# Patient Record
Sex: MALE | Race: WHITE | NOT HISPANIC OR LATINO | Employment: FULL TIME | URBAN - METROPOLITAN AREA
[De-identification: names, ages, dates, MRNs, and addresses within clinical notes are randomized per-mention and may not be internally consistent; named-entity substitution may affect disease eponyms.]

---

## 2019-02-24 ENCOUNTER — APPOINTMENT (EMERGENCY)
Dept: RADIOLOGY | Facility: HOSPITAL | Age: 54
DRG: 872 | End: 2019-02-24
Payer: COMMERCIAL

## 2019-02-24 ENCOUNTER — APPOINTMENT (EMERGENCY)
Dept: CT IMAGING | Facility: HOSPITAL | Age: 54
DRG: 872 | End: 2019-02-24
Payer: COMMERCIAL

## 2019-02-24 ENCOUNTER — HOSPITAL ENCOUNTER (INPATIENT)
Facility: HOSPITAL | Age: 54
LOS: 1 days | Discharge: HOME | DRG: 872 | End: 2019-02-25
Attending: EMERGENCY MEDICINE | Admitting: INTERNAL MEDICINE
Payer: COMMERCIAL

## 2019-02-24 DIAGNOSIS — J11.1 INFLUENZA-LIKE ILLNESS: ICD-10-CM

## 2019-02-24 DIAGNOSIS — R06.02 SHORTNESS OF BREATH: ICD-10-CM

## 2019-02-24 DIAGNOSIS — J40 BRONCHITIS: ICD-10-CM

## 2019-02-24 DIAGNOSIS — R50.9 FEVER: Primary | ICD-10-CM

## 2019-02-24 PROBLEM — J45.901 MODERATE ASTHMA WITH ACUTE EXACERBATION: Status: ACTIVE | Noted: 2019-02-24

## 2019-02-24 PROBLEM — R91.8 LUNG NODULES: Status: ACTIVE | Noted: 2019-02-24

## 2019-02-24 PROBLEM — A41.9 SEPSIS DUE TO UNDETERMINED ORGANISM (HCC): Status: ACTIVE | Noted: 2019-02-24

## 2019-02-24 PROBLEM — J20.9 ACUTE INFECTIVE TRACHEOBRONCHITIS: Status: ACTIVE | Noted: 2019-02-24

## 2019-02-24 LAB
ALBUMIN SERPL BCP-MCNC: 3.8 G/DL (ref 3.5–5)
ALP SERPL-CCNC: 82 U/L (ref 46–116)
ALT SERPL W P-5'-P-CCNC: 33 U/L (ref 12–78)
ANION GAP SERPL CALCULATED.3IONS-SCNC: 8 MMOL/L (ref 4–13)
APTT PPP: 27 SECONDS (ref 26–38)
AST SERPL W P-5'-P-CCNC: 20 U/L (ref 5–45)
BASOPHILS # BLD AUTO: 0.05 THOUSANDS/ΜL (ref 0–0.1)
BASOPHILS NFR BLD AUTO: 1 % (ref 0–1)
BILIRUB SERPL-MCNC: 0.7 MG/DL (ref 0.2–1)
BUN SERPL-MCNC: 13 MG/DL (ref 5–25)
CALCIUM SERPL-MCNC: 8.9 MG/DL (ref 8.3–10.1)
CHLORIDE SERPL-SCNC: 101 MMOL/L (ref 100–108)
CO2 SERPL-SCNC: 26 MMOL/L (ref 21–32)
CREAT SERPL-MCNC: 1.11 MG/DL (ref 0.6–1.3)
EOSINOPHIL # BLD AUTO: 0.13 THOUSAND/ΜL (ref 0–0.61)
EOSINOPHIL NFR BLD AUTO: 1 % (ref 0–6)
ERYTHROCYTE [DISTWIDTH] IN BLOOD BY AUTOMATED COUNT: 14.6 % (ref 11.6–15.1)
GFR SERPL CREATININE-BSD FRML MDRD: 76 ML/MIN/1.73SQ M
GLUCOSE SERPL-MCNC: 117 MG/DL (ref 65–140)
HCT VFR BLD AUTO: 49.5 % (ref 36.5–49.3)
HGB BLD-MCNC: 16.3 G/DL (ref 12–17)
IMM GRANULOCYTES # BLD AUTO: 0.03 THOUSAND/UL (ref 0–0.2)
IMM GRANULOCYTES NFR BLD AUTO: 0 % (ref 0–2)
INR PPP: 0.99 (ref 0.86–1.17)
LACTATE SERPL-SCNC: 1.8 MMOL/L (ref 0.5–2)
LYMPHOCYTES # BLD AUTO: 0.29 THOUSANDS/ΜL (ref 0.6–4.47)
LYMPHOCYTES NFR BLD AUTO: 3 % (ref 14–44)
MCH RBC QN AUTO: 27.5 PG (ref 26.8–34.3)
MCHC RBC AUTO-ENTMCNC: 32.9 G/DL (ref 31.4–37.4)
MCV RBC AUTO: 84 FL (ref 82–98)
MONOCYTES # BLD AUTO: 1.08 THOUSAND/ΜL (ref 0.17–1.22)
MONOCYTES NFR BLD AUTO: 11 % (ref 4–12)
NEUTROPHILS # BLD AUTO: 8.12 THOUSANDS/ΜL (ref 1.85–7.62)
NEUTS SEG NFR BLD AUTO: 84 % (ref 43–75)
NRBC BLD AUTO-RTO: 0 /100 WBCS
PLATELET # BLD AUTO: 186 THOUSANDS/UL (ref 149–390)
PMV BLD AUTO: 11.5 FL (ref 8.9–12.7)
POTASSIUM SERPL-SCNC: 3.7 MMOL/L (ref 3.5–5.3)
PROCALCITONIN SERPL-MCNC: 0.05 NG/ML
PROT SERPL-MCNC: 7.4 G/DL (ref 6.4–8.2)
PROTHROMBIN TIME: 12.8 SECONDS (ref 11.8–14.2)
RBC # BLD AUTO: 5.92 MILLION/UL (ref 3.88–5.62)
SODIUM SERPL-SCNC: 135 MMOL/L (ref 136–145)
WBC # BLD AUTO: 9.7 THOUSAND/UL (ref 4.31–10.16)

## 2019-02-24 PROCEDURE — 94640 AIRWAY INHALATION TREATMENT: CPT

## 2019-02-24 PROCEDURE — 99285 EMERGENCY DEPT VISIT HI MDM: CPT

## 2019-02-24 PROCEDURE — 99223 1ST HOSP IP/OBS HIGH 75: CPT | Performed by: INTERNAL MEDICINE

## 2019-02-24 PROCEDURE — 83605 ASSAY OF LACTIC ACID: CPT | Performed by: EMERGENCY MEDICINE

## 2019-02-24 PROCEDURE — 85610 PROTHROMBIN TIME: CPT | Performed by: EMERGENCY MEDICINE

## 2019-02-24 PROCEDURE — 94760 N-INVAS EAR/PLS OXIMETRY 1: CPT

## 2019-02-24 PROCEDURE — 85730 THROMBOPLASTIN TIME PARTIAL: CPT | Performed by: EMERGENCY MEDICINE

## 2019-02-24 PROCEDURE — 71045 X-RAY EXAM CHEST 1 VIEW: CPT

## 2019-02-24 PROCEDURE — 87040 BLOOD CULTURE FOR BACTERIA: CPT | Performed by: EMERGENCY MEDICINE

## 2019-02-24 PROCEDURE — 96375 TX/PRO/DX INJ NEW DRUG ADDON: CPT

## 2019-02-24 PROCEDURE — 94664 DEMO&/EVAL PT USE INHALER: CPT

## 2019-02-24 PROCEDURE — 94644 CONT INHLJ TX 1ST HOUR: CPT

## 2019-02-24 PROCEDURE — 71275 CT ANGIOGRAPHY CHEST: CPT

## 2019-02-24 PROCEDURE — 94150 VITAL CAPACITY TEST: CPT

## 2019-02-24 PROCEDURE — 36415 COLL VENOUS BLD VENIPUNCTURE: CPT | Performed by: EMERGENCY MEDICINE

## 2019-02-24 PROCEDURE — 87631 RESP VIRUS 3-5 TARGETS: CPT | Performed by: EMERGENCY MEDICINE

## 2019-02-24 PROCEDURE — 85025 COMPLETE CBC W/AUTO DIFF WBC: CPT | Performed by: EMERGENCY MEDICINE

## 2019-02-24 PROCEDURE — 84145 PROCALCITONIN (PCT): CPT | Performed by: EMERGENCY MEDICINE

## 2019-02-24 PROCEDURE — 96365 THER/PROPH/DIAG IV INF INIT: CPT

## 2019-02-24 PROCEDURE — 93005 ELECTROCARDIOGRAM TRACING: CPT

## 2019-02-24 PROCEDURE — 96360 HYDRATION IV INFUSION INIT: CPT

## 2019-02-24 PROCEDURE — 80053 COMPREHEN METABOLIC PANEL: CPT | Performed by: EMERGENCY MEDICINE

## 2019-02-24 RX ORDER — IBUPROFEN 400 MG/1
400 TABLET ORAL ONCE
Status: DISCONTINUED | OUTPATIENT
Start: 2019-02-24 | End: 2019-02-24

## 2019-02-24 RX ORDER — IBUPROFEN 600 MG/1
600 TABLET ORAL ONCE
Status: COMPLETED | OUTPATIENT
Start: 2019-02-24 | End: 2019-02-24

## 2019-02-24 RX ORDER — MONTELUKAST SODIUM 10 MG/1
10 TABLET ORAL
COMMUNITY
End: 2020-03-02 | Stop reason: SDUPTHER

## 2019-02-24 RX ORDER — CALCIUM CARBONATE 200(500)MG
1000 TABLET,CHEWABLE ORAL DAILY PRN
Status: DISCONTINUED | OUTPATIENT
Start: 2019-02-24 | End: 2019-02-25 | Stop reason: HOSPADM

## 2019-02-24 RX ORDER — ALBUTEROL SULFATE 2.5 MG/3ML
5 SOLUTION RESPIRATORY (INHALATION) ONCE
Status: DISCONTINUED | OUTPATIENT
Start: 2019-02-24 | End: 2019-02-24

## 2019-02-24 RX ORDER — GUAIFENESIN/DEXTROMETHORPHAN 100-10MG/5
10 SYRUP ORAL EVERY 4 HOURS PRN
Status: DISCONTINUED | OUTPATIENT
Start: 2019-02-24 | End: 2019-02-25 | Stop reason: HOSPADM

## 2019-02-24 RX ORDER — FLUTICASONE FUROATE AND VILANTEROL 200; 25 UG/1; UG/1
1 POWDER RESPIRATORY (INHALATION) DAILY
Status: DISCONTINUED | OUTPATIENT
Start: 2019-02-25 | End: 2019-02-25 | Stop reason: HOSPADM

## 2019-02-24 RX ORDER — SODIUM CHLORIDE FOR INHALATION 0.9 %
3 VIAL, NEBULIZER (ML) INHALATION ONCE
Status: COMPLETED | OUTPATIENT
Start: 2019-02-24 | End: 2019-02-24

## 2019-02-24 RX ORDER — METHYLPREDNISOLONE SODIUM SUCCINATE 40 MG/ML
40 INJECTION, POWDER, LYOPHILIZED, FOR SOLUTION INTRAMUSCULAR; INTRAVENOUS EVERY 8 HOURS SCHEDULED
Status: DISCONTINUED | OUTPATIENT
Start: 2019-02-24 | End: 2019-02-25

## 2019-02-24 RX ORDER — ACETAMINOPHEN 325 MG/1
975 TABLET ORAL ONCE
Status: COMPLETED | OUTPATIENT
Start: 2019-02-24 | End: 2019-02-24

## 2019-02-24 RX ORDER — MONTELUKAST SODIUM 10 MG/1
10 TABLET ORAL
Status: DISCONTINUED | OUTPATIENT
Start: 2019-02-24 | End: 2019-02-25 | Stop reason: HOSPADM

## 2019-02-24 RX ORDER — DOCUSATE SODIUM 100 MG/1
100 CAPSULE, LIQUID FILLED ORAL 2 TIMES DAILY PRN
Status: DISCONTINUED | OUTPATIENT
Start: 2019-02-24 | End: 2019-02-25 | Stop reason: HOSPADM

## 2019-02-24 RX ORDER — ONDANSETRON 2 MG/ML
4 INJECTION INTRAMUSCULAR; INTRAVENOUS EVERY 6 HOURS PRN
Status: DISCONTINUED | OUTPATIENT
Start: 2019-02-24 | End: 2019-02-25 | Stop reason: HOSPADM

## 2019-02-24 RX ORDER — METHYLPREDNISOLONE SODIUM SUCCINATE 125 MG/2ML
80 INJECTION, POWDER, LYOPHILIZED, FOR SOLUTION INTRAMUSCULAR; INTRAVENOUS ONCE
Status: COMPLETED | OUTPATIENT
Start: 2019-02-24 | End: 2019-02-24

## 2019-02-24 RX ORDER — GUAIFENESIN 600 MG
600 TABLET, EXTENDED RELEASE 12 HR ORAL EVERY 12 HOURS SCHEDULED
Status: DISCONTINUED | OUTPATIENT
Start: 2019-02-24 | End: 2019-02-25 | Stop reason: HOSPADM

## 2019-02-24 RX ORDER — IPRATROPIUM BROMIDE AND ALBUTEROL SULFATE 2.5; .5 MG/3ML; MG/3ML
3 SOLUTION RESPIRATORY (INHALATION) ONCE
Status: COMPLETED | OUTPATIENT
Start: 2019-02-24 | End: 2019-02-24

## 2019-02-24 RX ORDER — IPRATROPIUM BROMIDE AND ALBUTEROL SULFATE 2.5; .5 MG/3ML; MG/3ML
3 SOLUTION RESPIRATORY (INHALATION)
Status: DISCONTINUED | OUTPATIENT
Start: 2019-02-24 | End: 2019-02-25

## 2019-02-24 RX ADMIN — IOHEXOL 85 ML: 350 INJECTION, SOLUTION INTRAVENOUS at 17:42

## 2019-02-24 RX ADMIN — METHYLPREDNISOLONE SODIUM SUCCINATE 40 MG: 40 INJECTION, POWDER, FOR SOLUTION INTRAMUSCULAR; INTRAVENOUS at 21:33

## 2019-02-24 RX ADMIN — MONTELUKAST SODIUM 10 MG: 10 TABLET, FILM COATED ORAL at 21:32

## 2019-02-24 RX ADMIN — ACETAMINOPHEN 975 MG: 325 TABLET, FILM COATED ORAL at 16:37

## 2019-02-24 RX ADMIN — ALBUTEROL SULFATE 10 MG: 2.5 SOLUTION RESPIRATORY (INHALATION) at 18:39

## 2019-02-24 RX ADMIN — SODIUM CHLORIDE 2200 ML: 0.9 INJECTION, SOLUTION INTRAVENOUS at 17:28

## 2019-02-24 RX ADMIN — AZITHROMYCIN MONOHYDRATE 500 MG: 500 INJECTION, POWDER, LYOPHILIZED, FOR SOLUTION INTRAVENOUS at 18:19

## 2019-02-24 RX ADMIN — IPRATROPIUM BROMIDE AND ALBUTEROL SULFATE 3 ML: 2.5; .5 SOLUTION RESPIRATORY (INHALATION) at 21:42

## 2019-02-24 RX ADMIN — IBUPROFEN 600 MG: 600 TABLET ORAL at 16:37

## 2019-02-24 RX ADMIN — IPRATROPIUM BROMIDE AND ALBUTEROL SULFATE 3 ML: 2.5; .5 SOLUTION RESPIRATORY (INHALATION) at 17:17

## 2019-02-24 RX ADMIN — ISODIUM CHLORIDE 3 ML: 0.03 SOLUTION RESPIRATORY (INHALATION) at 18:39

## 2019-02-24 RX ADMIN — METHYLPREDNISOLONE SODIUM SUCCINATE 80 MG: 125 INJECTION, POWDER, FOR SOLUTION INTRAMUSCULAR; INTRAVENOUS at 16:17

## 2019-02-24 RX ADMIN — IPRATROPIUM BROMIDE 1 MG: 0.5 SOLUTION RESPIRATORY (INHALATION) at 18:39

## 2019-02-24 RX ADMIN — GUAIFENESIN 600 MG: 600 TABLET, EXTENDED RELEASE ORAL at 21:32

## 2019-02-24 RX ADMIN — IPRATROPIUM BROMIDE AND ALBUTEROL SULFATE 3 ML: 2.5; .5 SOLUTION RESPIRATORY (INHALATION) at 16:09

## 2019-02-24 RX ADMIN — CEFTRIAXONE 1000 MG: 1 INJECTION, POWDER, FOR SOLUTION INTRAMUSCULAR; INTRAVENOUS at 17:22

## 2019-02-24 NOTE — ASSESSMENT & PLAN NOTE
· Chronic moderate asthma with acute exacerbation  · Continue with IV Solu Medrol 40 mg q 8 hours  · Respiratory protocol and nebs  · Peak flow rate  · Supplemental oxygen

## 2019-02-24 NOTE — ASSESSMENT & PLAN NOTE
· Preibronchial thickening and ground glass opacities noted consistent with bronchitis  · Continue IV Rocephin and azithromycin  · Check pro calcitonin  · Obtain sputum for culture and sensitivity if able  · Follow blood cultures  · Follow influenza and RSV PCR

## 2019-02-24 NOTE — ED PROVIDER NOTES
History  Chief Complaint   Patient presents with    Asthma     Pt  c/o shortness of breath with asthma exascerbation for two days  Pt  takes singulair and advair with no relief  Pt  has been around family with fever and illness  56-year-old male presents today complaining of shortness of breath for 2 days  He does have a history of asthma, and has been using inhalers and singular at home without relief  Started with fever today, reports of daughter had an upper respiratory/flu-like illness earlier in the week  History provided by:  Patient  Shortness of Breath   Severity:  Moderate  Onset quality:  Gradual  Duration:  2 days  Timing:  Constant  Progression:  Worsening  Chronicity:  New  Context comment:  See HPI  Relieved by:  Nothing  Worsened by:  Nothing  Ineffective treatments:  Inhaler  Associated symptoms: fever    Associated symptoms: no abdominal pain, no chest pain, no headaches, no rash and no sore throat    Risk factors: no recent alcohol use, no obesity and no prolonged immobilization        Prior to Admission Medications   Prescriptions Last Dose Informant Patient Reported? Taking? albuterol (PROVENTIL HFA,VENTOLIN HFA) 90 mcg/act inhaler   Yes Yes   Sig: Inhale 2 puffs every 6 (six) hours as needed for wheezing   fluticasone-salmeterol (ADVAIR) 250-50 mcg/dose inhaler   Yes Yes   Sig: Inhale 1 puff 2 (two) times a day Rinse mouth after use    montelukast (SINGULAIR) 10 mg tablet   Yes Yes   Sig: Take 10 mg by mouth daily at bedtime      Facility-Administered Medications: None       Past Medical History:   Diagnosis Date    Asthma        Past Surgical History:   Procedure Laterality Date    NOSE SURGERY         No family history on file  I have reviewed and agree with the history as documented      Social History     Tobacco Use    Smoking status: Not on file   Substance Use Topics    Alcohol use: Not on file    Drug use: Not on file        Review of Systems   Constitutional: Positive for chills, fatigue and fever  HENT: Negative for postnasal drip, sore throat and trouble swallowing  Eyes: Negative for visual disturbance  Respiratory: Positive for chest tightness and shortness of breath  Cardiovascular: Negative for chest pain  Gastrointestinal: Negative for abdominal pain  Genitourinary: Negative for dysuria  Musculoskeletal: Negative for back pain  Skin: Negative for rash  Allergic/Immunologic: Negative for immunocompromised state  Neurological: Negative for dizziness, light-headedness and headaches  Psychiatric/Behavioral: Positive for confusion  Physical Exam  Physical Exam   Constitutional: He is oriented to person, place, and time  He appears well-developed and well-nourished  He appears distressed ( appears uncomfortable)  HENT:   Head: Normocephalic and atraumatic  Mouth/Throat: Uvula is midline, oropharynx is clear and moist and mucous membranes are normal  No tonsillar exudate  Eyes: Pupils are equal, round, and reactive to light  Neck: Normal range of motion  Neck supple  Cardiovascular: Tachycardia present  Pulmonary/Chest: Tachypnea (Mildly tachypneic at 22 respirations per minute) noted  He has decreased breath sounds ( diffusely)  He has wheezes ( moderate, diffuse)  Abdominal: Soft  Bowel sounds are normal  There is no tenderness  There is no rebound and no guarding  Musculoskeletal: He exhibits no edema, tenderness or deformity  Neurological: He is alert and oriented to person, place, and time  Patient moving all extremities equally, no focal neuro deficits noted  Skin: Skin is warm and dry  Capillary refill takes less than 2 seconds  Psychiatric: He has a normal mood and affect  Nursing note and vitals reviewed        Vital Signs  ED Triage Vitals   Temperature Pulse Respirations Blood Pressure SpO2   02/24/19 1550 02/24/19 1550 02/24/19 1550 02/24/19 1600 02/24/19 1550   (!) 101 9 °F (38 8 °C) (!) 111 22 136/83 93 %      Temp Source Heart Rate Source Patient Position - Orthostatic VS BP Location FiO2 (%)   02/24/19 1550 02/24/19 1630 02/24/19 1600 02/24/19 1600 --   Oral Monitor Lying Right arm       Pain Score       02/24/19 1637       No Pain           Vitals:    02/24/19 1630 02/24/19 1700 02/24/19 1735 02/24/19 1800   BP: 127/72 99/62 108/64 113/67   Pulse: (!) 113 (!) 106  (!) 112   Patient Position - Orthostatic VS: Lying Lying  Lying       Visual Acuity      ED Medications  Medications   azithromycin (ZITHROMAX) 500 mg in sodium chloride 0 9% 250mL IVPB 500 mg (500 mg Intravenous New Bag 2/24/19 1819)   sodium chloride 0 9 % bolus 2,200 mL (2,200 mL Intravenous New Bag 2/24/19 1728)   albuterol inhalation solution 10 mg (has no administration in time range)     And   ipratropium (ATROVENT) 0 02 % inhalation solution 1 mg (has no administration in time range)     And   sodium chloride 0 9 % inhalation solution 3 mL (has no administration in time range)   methylPREDNISolone sodium succinate (Solu-MEDROL) injection 80 mg (80 mg Intravenous Given 2/24/19 1617)   ipratropium-albuterol (DUO-NEB) 0 5-2 5 mg/3 mL inhalation solution 3 mL (3 mL Nebulization Given 2/24/19 1609)   acetaminophen (TYLENOL) tablet 975 mg (975 mg Oral Given 2/24/19 1637)   ibuprofen (MOTRIN) tablet 600 mg (600 mg Oral Given 2/24/19 1637)   ceftriaxone (ROCEPHIN) 1 g/50 mL in dextrose IVPB (0 mg Intravenous Stopped 2/24/19 1819)   ipratropium-albuterol (DUO-NEB) 0 5-2 5 mg/3 mL inhalation solution 3 mL (3 mL Nebulization Given 2/24/19 1717)   iohexol (OMNIPAQUE) 350 MG/ML injection (MULTI-DOSE) 85 mL (85 mL Intravenous Given 2/24/19 1742)       Diagnostic Studies  Results Reviewed     Procedure Component Value Units Date/Time    Lactic acid x2 [[id number]]  (Normal) Collected:  02/24/19 1619    Lab Status:  Final result Specimen:  Blood from Arm, Right Updated:  02/24/19 1651     LACTIC ACID 1 8 mmol/L     Narrative:       Result may be elevated if tourniquet was used during collection  Comprehensive metabolic panel [[id number]]  (Abnormal) Collected:  02/24/19 1619    Lab Status:  Final result Specimen:  Blood from Arm, Right Updated:  02/24/19 1648     Sodium 135 mmol/L      Potassium 3 7 mmol/L      Chloride 101 mmol/L      CO2 26 mmol/L      ANION GAP 8 mmol/L      BUN 13 mg/dL      Creatinine 1 11 mg/dL      Glucose 117 mg/dL      Calcium 8 9 mg/dL      AST 20 U/L      ALT 33 U/L      Alkaline Phosphatase 82 U/L      Total Protein 7 4 g/dL      Albumin 3 8 g/dL      Total Bilirubin 0 70 mg/dL      eGFR 76 ml/min/1 73sq m     Narrative:       National Kidney Disease Education Program recommendations are as follows:  GFR calculation is accurate only with a steady state creatinine  Chronic Kidney disease less than 60 ml/min/1 73 sq  meters  Kidney failure less than 15 ml/min/1 73 sq  meters      Protime-INR [[id number]]  (Normal) Collected:  02/24/19 1619    Lab Status:  Final result Specimen:  Blood from Arm, Right Updated:  02/24/19 1642     Protime 12 8 seconds      INR 0 99    APTT [[id number]]  (Normal) Collected:  02/24/19 1619    Lab Status:  Final result Specimen:  Blood from Arm, Right Updated:  02/24/19 1642     PTT 27 seconds     CBC and differential [[id number]]  (Abnormal) Collected:  02/24/19 1619    Lab Status:  Final result Specimen:  Blood from Arm, Right Updated:  02/24/19 1632     WBC 9 70 Thousand/uL      RBC 5 92 Million/uL      Hemoglobin 16 3 g/dL      Hematocrit 49 5 %      MCV 84 fL      MCH 27 5 pg      MCHC 32 9 g/dL      RDW 14 6 %      MPV 11 5 fL      Platelets 407 Thousands/uL      nRBC 0 /100 WBCs      Neutrophils Relative 84 %      Immat GRANS % 0 %      Lymphocytes Relative 3 %      Monocytes Relative 11 %      Eosinophils Relative 1 %      Basophils Relative 1 %      Neutrophils Absolute 8 12 Thousands/µL      Immature Grans Absolute 0 03 Thousand/uL      Lymphocytes Absolute 0 29 Thousands/µL      Monocytes Absolute 1 08 Thousand/µL      Eosinophils Absolute 0 13 Thousand/µL      Basophils Absolute 0 05 Thousands/µL     Procalcitonin [[id number]] Collected:  02/24/19 1619    Lab Status: In process Specimen:  Blood from Arm, Right Updated:  02/24/19 1630    Influenza A/B and RSV by PCR [[id number]] Collected:  02/24/19 1623    Lab Status: In process Specimen:  Nasopharyngeal Swab Updated:  02/24/19 1629    Blood culture #1 [[id number]] Collected:  02/24/19 1619    Lab Status: In process Specimen:  Blood from Arm, Right Updated:  02/24/19 1629    Blood culture #2 [[id number]] Collected:  02/24/19 1621    Lab Status: In process Specimen:  Blood from Arm, Left Updated:  02/24/19 1629    Lactic acid x2 [[id number]]     Lab Status:  No result Specimen:  Blood                  CTA ED chest PE study   Final Result by Alex Andre DO (02/24 1802)   No central pulmonary emboli  The more peripheral branch arteries are not as well opacified but appear grossly patent  There is diffuse peribronchial thickening observed in both lungs, most pronounced in the right lower lobe with resulting luminal narrowing  Etiology could be infectious (bronchitis) or inflammatory/allergic including asthma  Scattered groundglass nodular    opacities in the basilar right lower lobe may be atelectatic or inflammatory/infectious  No lobar consolidation  No pleural effusion  No cardiomegaly or vascular congestion to suggest pulmonary edema  Reactive prominent lymph node in the mediastinum and    both shandra      Pulmonary nodules measuring up to 7 mm in the left upper lobe  Based on current Fleischner Society 2017 Guidelines on incidental pulmonary nodule, followup non-contrast CT is recommended at 3-6 months from the initial examination and, if stable at that time, an additional followup is recommended for 18-24 months    from the initial examination  Small sliding hiatal hernia  The study was marked in [facility] for immediate notification  Workstation performed: [id number]         XR chest portable    (Results Pending)              Procedures  ECG 12 Lead Documentation  Date/Time: 2/24/2019 5:47 PM  Performed by: Niecy Mccain DO  Authorized by: Yeni Shaw DO     Indications / Diagnosis:  Shortness of breath  ECG reviewed by me, the ED Provider: yes    Patient location:  ED  Previous ECG:     Previous ECG:  Unavailable  Comments:      Sinus tachycardia at 106 beats per minute  Normal axis, normal intervals, nonspecific ST T wave abnormalities inferior laterally  QTC is normal   There is no old available for comparison  Phone Contacts  ED Phone Contact    ED Course                               MDM  Number of Diagnoses or Management Options  Bronchitis: new and requires workup  Fever: new and requires workup  Influenza-like illness: new and requires workup  Shortness of breath: new and requires workup  Diagnosis management comments: 5:15 PM  Sepsis fluid orders in now  Patient did not have a documented weight when I put my initial orders an which is the cause for the delay in ordering sepsis fluids  Patient may have a developing right middle lobe infiltrate on chest x-ray, however I am going to order a CT scan his chest to get a better view of his chest and as well as rule out PE     6:21 PM  Reports feeling short of breath again  O2 sat 97% on 2L NC  Breath sounds still wheezing diffusely  Will d/w SLIM for admission  6:24 PM  MDM: Patient presents to the Emergency Department and was diagnosed with acute fever and shortness of breath  This is a new problem that will require additional planned work-up in a hospitalized setting  Clinical laboratory testing, radiology imaging, and medical testing (EKG) were ordered  I independently reviewed the radiologic imaging, EKG, and laboratory studies    This case is considered high risk secondary to the above listed disease process that poses a threat to bodily function that requires further diagnostic testing and management which may include the administration of parenteral controlled substances  Discussed with ANTHONY  We had a detailed discussion of the patient's condition and case,  including need for admission  Accepts to his/her service  Bed request/bridging orders placed  Amount and/or Complexity of Data Reviewed  Clinical lab tests: ordered and reviewed  Tests in the radiology section of CPT®: ordered and reviewed  Tests in the medicine section of CPT®: reviewed and ordered  Review and summarize past medical records: yes  Independent visualization of images, tracings, or specimens: yes    Risk of Complications, Morbidity, and/or Mortality  Presenting problems: high  Diagnostic procedures: high  Management options: high    Patient Progress  Patient progress: stable      Disposition  Final diagnoses:   Fever   Shortness of breath   Bronchitis   Influenza-like illness     Time reflects when diagnosis was documented in both MDM as applicable and the Disposition within this note     Time User Action Codes Description Comment    2/24/2019  5:22 PM [employer] Add [R50 9] Fever     2/24/2019  5:22 PM [employer] Add [R06 02] Shortness of breath     2/24/2019  6:20 PM [employer] Add [J40] Bronchitis     2/24/2019  6:20 PM Azra Shaw illness       ED Disposition     ED Disposition Condition Date/Time Comment    Admit Stable Sun Feb 24, 2019  6:24 PM Case was discussed with ANTHONY and the patient's admission status was agreed to be Admission Status: inpatient status to the service of Dr Santana Delacruz   Follow-up Information    None         Patient's Medications   Discharge Prescriptions    No medications on file     No discharge procedures on file      ED Provider  Electronically Signed by           Jonas Vargas DO  02/24/19 5897

## 2019-02-24 NOTE — LETTER
[address] [facility] MED SURG UNIT  [address] [facility] [address]  Dept: [phone]    February 25, 2019     Patient: Rebecca Vines   YOB: 1965   Date of Visit: 2/24/2019       To Whom it May Concern:    Little Kaye is under my professional care  He was seen in the hospital from 2/24/2019   to 02/25/19  He may return to work on March 4, 2019  without limitations  If you have any questions or concerns, please don't hesitate to call           Sincerely,          Deirdre Bello PA-C  [address] [facility]   Internal Medicine Hospitalist service   [phone]

## 2019-02-24 NOTE — H&P
H&P- Nunu [facility] 1965, 47 y o  male MRN: [id number]    Unit/Bed#: ED 29 Encounter: [id number]    Primary Care Provider: No primary care provider on file  Date and time admitted to hospital: 2/24/2019  3:49 PM    * Moderate asthma with acute exacerbation  Assessment & Plan  · Chronic moderate asthma with acute exacerbation  · Continue with IV Solu Medrol 40 mg q 8 hours  · Respiratory protocol and nebs  · Peak flow rate  · Supplemental oxygen    Acute bronchitis due to infection  Assessment & Plan  · Preibronchial thickening and ground glass opacities noted consistent with bronchitis  · Continue IV Rocephin and azithromycin  · Check pro calcitonin  · Obtain sputum for culture and sensitivity if able  · Follow blood cultures  · Follow influenza and RSV PCR    Sepsis due to undetermined organism [facility])  Assessment & Plan  · Fever, tachycardia and bronchitis  · Continue to monitor parameters  · Lactic acid was negative    Lung nodules  Assessment & Plan  · Patient is nonsmoker, low risk patient  · Outpatient surveillance      VTE Prophylaxis: Enoxaparin (Lovenox)  / sequential compression device   Code Status:  Full code  POLST: POLST form is on file already (pre-hospital) and There is no POLST form on file for this patient (pre-hospital)  Discussion with family:  Wife at bedside    Anticipated Length of Stay:  Patient will be admitted on an Inpatient basis with an anticipated length of stay of  > 2 midnights  Justification for Hospital Stay:  Sepsis/bronchitis and asthma exacerbation    Total Time for Visit, including Counseling / Coordination of Care: 1 hour  Greater than 50% of this total time spent on direct patient counseling and coordination of care  Chief Complaint:   Shortness of breath     History of Present Illness:    Nunu Reynoso is a 47 y o  male patient with asthma who presents with shortness of breath over last 2 days    Patient was in contact with his daughter who was having upper respiratory tract infection and flu like illness  He started having increasing shortness of breath over last 2 days  Today he also had fever  Has been using Advair and annular at home without relief  Reports chills  Positive cough without any sputum  No hemoptysis  Denies chest pain  No nausea vomiting or diarrhea  History of asthma diagnosed at the age of 9  Has been well controlled  No history of intubations  Follows with allergist      Review of Systems:    Review of Systems  Twelve point review systems negative except noted above  Past Medical and Surgical History:     Past Medical History:   Diagnosis Date    Asthma        Past Surgical History:   Procedure Laterality Date    NOSE SURGERY         Meds/Allergies:    Prior to Admission medications    Medication Sig Start Date End Date Taking? Authorizing Provider   albuterol (PROVENTIL HFA,VENTOLIN HFA) 90 mcg/act inhaler Inhale 2 puffs every 6 (six) hours as needed for wheezing   Yes Historical Provider, MD   fluticasone-salmeterol (ADVAIR) 250-50 mcg/dose inhaler Inhale 1 puff 2 (two) times a day Rinse mouth after use  Yes Historical Provider, MD   montelukast (SINGULAIR) 10 mg tablet Take 10 mg by mouth daily at bedtime   Yes Historical Provider, MD     I have reviewed home medications with patient personally      Allergies: No Known Allergies    Social History:     Marital Status: /Civil Union   Occupation:  Works on [employer]  Patient Pre-hospital Living Situation:  Lives with family  Patient Pre-hospital Level of Mobility:  Independent  Patient Pre-hospital Diet Restrictions:  None  Substance Use History:   Social History     Substance and Sexual Activity   Alcohol Use Not on file     Social History     Tobacco Use   Smoking Status Not on file     Social History     Substance and Sexual Activity   Drug Use Not on file       Family History:    non-contributory    Physical Exam:     Vitals:   Blood Pressure: 94/50 (02/24/19 1830)  Pulse: 104 (02/24/19 1830)  Temperature: 98 4 °F (36 9 °C) (02/24/19 1821)  Temp Source: Oral (02/24/19 1821)  Respirations: 22 (02/24/19 1830)  Height: 5' 6" (167 6 cm) (02/24/19 1628)  Weight - Scale: 73 3 kg (161 lb 9 6 oz) (02/24/19 1550)  SpO2: 95 % (02/24/19 1840)    Physical Exam     Gen -Patient comfortable   Neck- Supple  No thyromegaly or lymphadenopathy  Mild use of accessory muscles  Unable to complete full sentences  Lungs-bilateral expiratory wheezing  Heart S1-S2, regular rate and rhythm, no murmurs tachycardia  Abdomen-soft nontender, no organomegaly  Bowel sounds present  Extremities-no cyanosi,  clubbing or edema  Skin- no rash  Neuro-nonfocal       Additional Data:     Lab Results: I have personally reviewed pertinent reports  Results from last 7 days   Lab Units 02/24/19  1619   WBC Thousand/uL 9 70   HEMOGLOBIN g/dL 16 3   HEMATOCRIT % 49 5*   PLATELETS Thousands/uL 186   NEUTROS PCT % 84*   LYMPHS PCT % 3*   MONOS PCT % 11   EOS PCT % 1     Results from last 7 days   Lab Units 02/24/19  1619   SODIUM mmol/L 135*   POTASSIUM mmol/L 3 7   CHLORIDE mmol/L 101   CO2 mmol/L 26   BUN mg/dL 13   CREATININE mg/dL 1 11   ANION GAP mmol/L 8   CALCIUM mg/dL 8 9   ALBUMIN g/dL 3 8   TOTAL BILIRUBIN mg/dL 0 70   ALK PHOS U/L 82   ALT U/L 33   AST U/L 20   GLUCOSE RANDOM mg/dL 117     Results from last 7 days   Lab Units 02/24/19  1619   INR  0 99             Results from last 7 days   Lab Units 02/24/19  1619   LACTIC ACID mmol/L 1 8       Imaging: I have personally reviewed pertinent reports  CTA ED chest PE study   Final Result by Hari Farrar DO (02/24 1802)   No central pulmonary emboli  The more peripheral branch arteries are not as well opacified but appear grossly patent  There is diffuse peribronchial thickening observed in both lungs, most pronounced in the right lower lobe with resulting luminal narrowing   Etiology could be infectious (bronchitis) or inflammatory/allergic including asthma  Scattered groundglass nodular    opacities in the basilar right lower lobe may be atelectatic or inflammatory/infectious  No lobar consolidation  No pleural effusion  No cardiomegaly or vascular congestion to suggest pulmonary edema  Reactive prominent lymph node in the mediastinum and    both shandra      Pulmonary nodules measuring up to 7 mm in the left upper lobe  Based on current Fleischner Society 2017 Guidelines on incidental pulmonary nodule, followup non-contrast CT is recommended at 3-6 months from the initial examination and, if stable at that time, an additional followup is recommended for 18-24 months    from the initial examination  Small sliding hiatal hernia  The study was marked in [facility] for immediate notification  Workstation performed: [id number]         XR chest portable    (Results Pending)       EKG, Pathology, and Other Studies Reviewed on Admission:   · EKG:  Sinus tachycardia    Allscripts / Epic Records Reviewed: Yes     ** Please Note: This note has been constructed using a voice recognition system   **

## 2019-02-25 VITALS
TEMPERATURE: 99.2 F | WEIGHT: 161.6 LBS | SYSTOLIC BLOOD PRESSURE: 131 MMHG | OXYGEN SATURATION: 94 % | HEIGHT: 66 IN | HEART RATE: 98 BPM | BODY MASS INDEX: 25.97 KG/M2 | DIASTOLIC BLOOD PRESSURE: 72 MMHG | RESPIRATION RATE: 16 BRPM

## 2019-02-25 LAB
ANION GAP SERPL CALCULATED.3IONS-SCNC: 13 MMOL/L (ref 4–13)
ATRIAL RATE: 106 BPM
BUN SERPL-MCNC: 10 MG/DL (ref 5–25)
CALCIUM SERPL-MCNC: 8.5 MG/DL (ref 8.3–10.1)
CHLORIDE SERPL-SCNC: 106 MMOL/L (ref 100–108)
CO2 SERPL-SCNC: 20 MMOL/L (ref 21–32)
CREAT SERPL-MCNC: 1.07 MG/DL (ref 0.6–1.3)
ERYTHROCYTE [DISTWIDTH] IN BLOOD BY AUTOMATED COUNT: 15 % (ref 11.6–15.1)
FLUAV AG SPEC QL: DETECTED
FLUBV AG SPEC QL: NOT DETECTED
GFR SERPL CREATININE-BSD FRML MDRD: 79 ML/MIN/1.73SQ M
GLUCOSE SERPL-MCNC: 167 MG/DL (ref 65–140)
HCT VFR BLD AUTO: 43 % (ref 36.5–49.3)
HGB BLD-MCNC: 14 G/DL (ref 12–17)
L PNEUMO1 AG UR QL IA.RAPID: NEGATIVE
MAGNESIUM SERPL-MCNC: 2.1 MG/DL (ref 1.6–2.6)
MCH RBC QN AUTO: 27.9 PG (ref 26.8–34.3)
MCHC RBC AUTO-ENTMCNC: 32.6 G/DL (ref 31.4–37.4)
MCV RBC AUTO: 86 FL (ref 82–98)
P AXIS: 68 DEGREES
PLATELET # BLD AUTO: 155 THOUSANDS/UL (ref 149–390)
PLATELET # BLD AUTO: 169 THOUSANDS/UL (ref 149–390)
PMV BLD AUTO: 11.7 FL (ref 8.9–12.7)
PMV BLD AUTO: 11.7 FL (ref 8.9–12.7)
POTASSIUM SERPL-SCNC: 3.6 MMOL/L (ref 3.5–5.3)
PR INTERVAL: 124 MS
PROCALCITONIN SERPL-MCNC: <0.05 NG/ML
QRS AXIS: 37 DEGREES
QRSD INTERVAL: 94 MS
QT INTERVAL: 312 MS
QTC INTERVAL: 414 MS
RBC # BLD AUTO: 5.02 MILLION/UL (ref 3.88–5.62)
RSV B RNA SPEC QL NAA+PROBE: NOT DETECTED
S PNEUM AG UR QL: NEGATIVE
SODIUM SERPL-SCNC: 139 MMOL/L (ref 136–145)
T WAVE AXIS: -18 DEGREES
VENTRICULAR RATE: 106 BPM
WBC # BLD AUTO: 7.51 THOUSAND/UL (ref 4.31–10.16)

## 2019-02-25 PROCEDURE — 84145 PROCALCITONIN (PCT): CPT | Performed by: INTERNAL MEDICINE

## 2019-02-25 PROCEDURE — 93010 ELECTROCARDIOGRAM REPORT: CPT | Performed by: INTERNAL MEDICINE

## 2019-02-25 PROCEDURE — 87070 CULTURE OTHR SPECIMN AEROBIC: CPT | Performed by: INTERNAL MEDICINE

## 2019-02-25 PROCEDURE — 83735 ASSAY OF MAGNESIUM: CPT | Performed by: INTERNAL MEDICINE

## 2019-02-25 PROCEDURE — 87205 SMEAR GRAM STAIN: CPT | Performed by: INTERNAL MEDICINE

## 2019-02-25 PROCEDURE — 94760 N-INVAS EAR/PLS OXIMETRY 1: CPT

## 2019-02-25 PROCEDURE — 85049 AUTOMATED PLATELET COUNT: CPT | Performed by: INTERNAL MEDICINE

## 2019-02-25 PROCEDURE — 94150 VITAL CAPACITY TEST: CPT

## 2019-02-25 PROCEDURE — 94640 AIRWAY INHALATION TREATMENT: CPT

## 2019-02-25 PROCEDURE — 87449 NOS EACH ORGANISM AG IA: CPT | Performed by: INTERNAL MEDICINE

## 2019-02-25 PROCEDURE — 80048 BASIC METABOLIC PNL TOTAL CA: CPT | Performed by: INTERNAL MEDICINE

## 2019-02-25 PROCEDURE — 99239 HOSP IP/OBS DSCHRG MGMT >30: CPT | Performed by: PHYSICIAN ASSISTANT

## 2019-02-25 PROCEDURE — 85027 COMPLETE CBC AUTOMATED: CPT | Performed by: INTERNAL MEDICINE

## 2019-02-25 RX ORDER — METHYLPREDNISOLONE SODIUM SUCCINATE 40 MG/ML
40 INJECTION, POWDER, LYOPHILIZED, FOR SOLUTION INTRAMUSCULAR; INTRAVENOUS EVERY 8 HOURS SCHEDULED
Status: COMPLETED | OUTPATIENT
Start: 2019-02-25 | End: 2019-02-25

## 2019-02-25 RX ORDER — LEVALBUTEROL 1.25 MG/.5ML
1.25 SOLUTION, CONCENTRATE RESPIRATORY (INHALATION)
Status: DISCONTINUED | OUTPATIENT
Start: 2019-02-25 | End: 2019-02-25 | Stop reason: HOSPADM

## 2019-02-25 RX ORDER — ALBUTEROL SULFATE 90 UG/1
2 AEROSOL, METERED RESPIRATORY (INHALATION) EVERY 4 HOURS PRN
Status: DISCONTINUED | OUTPATIENT
Start: 2019-02-25 | End: 2019-02-25 | Stop reason: HOSPADM

## 2019-02-25 RX ORDER — OSELTAMIVIR PHOSPHATE 75 MG/1
75 CAPSULE ORAL EVERY 12 HOURS SCHEDULED
Status: DISCONTINUED | OUTPATIENT
Start: 2019-02-25 | End: 2019-02-25 | Stop reason: HOSPADM

## 2019-02-25 RX ORDER — GUAIFENESIN/DEXTROMETHORPHAN 100-10MG/5
10 SYRUP ORAL EVERY 4 HOURS PRN
Qty: 118 ML | Refills: 0 | Status: SHIPPED | OUTPATIENT
Start: 2019-02-25 | End: 2019-12-02

## 2019-02-25 RX ORDER — OSELTAMIVIR PHOSPHATE 75 MG/1
75 CAPSULE ORAL EVERY 12 HOURS SCHEDULED
Qty: 9 CAPSULE | Refills: 0 | Status: SHIPPED | OUTPATIENT
Start: 2019-02-25 | End: 2019-03-02

## 2019-02-25 RX ORDER — PREDNISONE 10 MG/1
TABLET ORAL
Qty: 30 TABLET | Refills: 0 | Status: SHIPPED | OUTPATIENT
Start: 2019-02-25 | End: 2019-10-31 | Stop reason: SDUPTHER

## 2019-02-25 RX ADMIN — IPRATROPIUM BROMIDE 0.5 MG: 0.5 SOLUTION RESPIRATORY (INHALATION) at 02:29

## 2019-02-25 RX ADMIN — OSELTAMIVIR PHOSPHATE 75 MG: 75 CAPSULE ORAL at 13:58

## 2019-02-25 RX ADMIN — ENOXAPARIN SODIUM 40 MG: 40 INJECTION SUBCUTANEOUS at 09:19

## 2019-02-25 RX ADMIN — IPRATROPIUM BROMIDE 0.5 MG: 0.5 SOLUTION RESPIRATORY (INHALATION) at 08:20

## 2019-02-25 RX ADMIN — LEVALBUTEROL HYDROCHLORIDE 1.25 MG: 1.25 SOLUTION, CONCENTRATE RESPIRATORY (INHALATION) at 02:29

## 2019-02-25 RX ADMIN — LEVALBUTEROL HYDROCHLORIDE 1.25 MG: 1.25 SOLUTION, CONCENTRATE RESPIRATORY (INHALATION) at 13:43

## 2019-02-25 RX ADMIN — METHYLPREDNISOLONE SODIUM SUCCINATE 40 MG: 40 INJECTION, POWDER, FOR SOLUTION INTRAMUSCULAR; INTRAVENOUS at 05:09

## 2019-02-25 RX ADMIN — FLUTICASONE FUROATE AND VILANTEROL TRIFENATATE 1 PUFF: 200; 25 POWDER RESPIRATORY (INHALATION) at 09:21

## 2019-02-25 RX ADMIN — GUAIFENESIN 600 MG: 600 TABLET, EXTENDED RELEASE ORAL at 09:19

## 2019-02-25 RX ADMIN — METHYLPREDNISOLONE SODIUM SUCCINATE 40 MG: 40 INJECTION, POWDER, FOR SOLUTION INTRAMUSCULAR; INTRAVENOUS at 13:58

## 2019-02-25 RX ADMIN — LEVALBUTEROL HYDROCHLORIDE 1.25 MG: 1.25 SOLUTION, CONCENTRATE RESPIRATORY (INHALATION) at 08:19

## 2019-02-25 RX ADMIN — IPRATROPIUM BROMIDE 0.5 MG: 0.5 SOLUTION RESPIRATORY (INHALATION) at 13:43

## 2019-02-25 NOTE — DISCHARGE SUMMARY
Discharge- Corby Ledesma 1965, 47 y o  male MRN: [id number]  Unit/Bed#: -01 Encounter: [id number] DOS: 2/25/19  Primary Care Provider: No primary care provider on file  Date and time admitted to hospital: 2/24/2019  3:49 PM    * Moderate asthma with acute exacerbation  Assessment & Plan  · Chronic moderate asthma with acute exacerbation  · Taper steroids to PO prednisone   · Continue outpatient inhalers   · Follow up with pulm/allergist     Acute bronchitis due to infection  Assessment & Plan  · Preibronchial thickening and ground glass opacities noted on CT chest consistent with bronchitis  · Procalcitonin normal, urinary antigens negative - d/c antibiotics   · blood cultures pending on d/c - will follow up   · Positive influenza A - start tamiflu as sx just started less than 48 hrs ago     Lung nodules  Assessment & Plan  · Patient is nonsmoker, low risk patient  · Outpatient surveillance    Sepsis due to undetermined organism ([address] )  Assessment & Plan  · POA, e/b fever, tachycardia and bronchitis  · Continue to monitor parameters  · Lactic acid was negative    Discharging Physician / Practitioner: Shireen Hameed PA-C  PCP: No primary care provider on file  Admission Date:   Admission Orders (From admission, onward)    Ordered        02/24/19 Cassie Mathew 694  Inpatient Admission (expected length of stay for this patient Order details is greater than two midnights)  Once     Order ID Start Status   [id number] 02/24/19 1827 Completed              Discharge Date: 02/25/19    Resolved Problems  Date Reviewed: 2/25/2019    None        Consultations During Hospital Stay:  · None     Procedures Performed:   · None     Significant Findings / Test Results:   · PCT normal  · Asthma exacerbation 2/2 acute bronchitis in setting of influenza A   · Sepsis POA   · CTA Chest- No central pulmonary emboli  The more peripheral branch arteries are not as well opacified but appear grossly patent  There is diffuse peribronchial thickening observed in both lungs, most pronounced in the right lower lobe with resulting luminal narrowing  Etiology could be infectious (bronchitis) or inflammatory/allergic including asthma  Scattered groundglass nodular opacities in the basilar right lower lobe may be atelectatic or inflammatory/infectious  No lobar consolidation  No pleural effusion  No cardiomegaly or vascular congestion to suggest pulmonary edema  Reactive prominent lymph node in the mediastinum and both shandra Pulmonary nodules measuring up to 7 mm in the left upper lobe  Based on current Fleischner Society 2017 Guidelines on incidental pulmonary nodule, followup non-contrast CT is recommended at 3-6 months from the initial examination and, if stable at that time, an additional followup is recommended for 18-24 months from the initial examination  Small sliding hiatal hernia  Incidental Findings:   · None     Test Results Pending at Discharge (will require follow up): · None      Outpatient Tests Requested:  · Follow up  w PCP, allergist     Complications:  None     Reason for Admission: SOB     Hospital Course:     Ramona Lara is a 47 y o  male with past medical history significant for asthma patient who originally presented to the hospital on 2/24/2019 due to shortness of breath and fever  Patient's chest x-ray was negative for pneumonia but influenza screen positive for influenza a infection  Patient was started on IV fluids, IV steroids and IV antibiotics  Procalcitonin was normal and antibiotics were discontinued  He was started on Tamiflu and transition to p o  Steroids upon discharge  Patient shortness of breath and wheezing improved significantly with nebulizers and steroids and he is medically stable for discharge home with close outpatient follow-up       The patient, initially admitted to the hospital as inpatient, was discharged earlier than expected given the following: improved in symptomatology      Please see above list of diagnoses and related plan for additional information  Condition at Discharge: stable     Discharge Day Visit / Exam:     Subjective:  SOB improved, states " I feel 1000 times better " no fevers or chills  Vitals: Blood Pressure: 126/68 (02/25/19 1100)  Pulse: 96 (02/25/19 1100)  Temperature: 98 7 °F (37 1 °C) (02/25/19 1100)  Temp Source: Oral (02/25/19 1100)  Respirations: 18 (02/25/19 1100)  Height: 5' 6" (167 6 cm) (02/24/19 1628)  Weight - Scale: 73 3 kg (161 lb 9 6 oz) (02/24/19 1550)  SpO2: 98 % (02/25/19 1343)    Exam:   Physical Exam   Constitutional: He is oriented to person, place, and time  He appears well-developed and well-nourished  HENT:   Head: Normocephalic and atraumatic  Eyes: EOM are normal  No scleral icterus  Neck: Normal range of motion  Neck supple  Cardiovascular: Normal rate and regular rhythm  No murmur heard  Pulmonary/Chest: Effort normal and breath sounds normal    On room air, few exp wheezes noted    Abdominal: Soft  Bowel sounds are normal    Musculoskeletal: Normal range of motion  He exhibits no edema  Neurological: He is alert and oriented to person, place, and time  Skin: Skin is warm and dry  Psychiatric: He has a normal mood and affect  Discussion with Family: none     Discharge instructions/Information to patient and family:   See after visit summary for information provided to patient and family  Provisions for Follow-Up Care:  See after visit summary for information related to follow-up care and any pertinent home health orders  Disposition:     Home    For Discharges to [facility]:   · Not Applicable to this Patient - Not Applicable to this Patient    Planned Readmission: none      Discharge Statement:  I spent 45 minutes discharging the patient  This time was spent on the day of discharge  I had direct contact with the patient on the day of discharge   Greater than 50% of the total time was spent examining patient, answering all patient questions, arranging and discussing plan of care with patient as well as directly providing post-discharge instructions  Additional time then spent on discharge activities  Discharge Medications:  See after visit summary for reconciled discharge medications provided to patient and family        ** Please Note: This note has been constructed using a voice recognition system **

## 2019-02-25 NOTE — UTILIZATION REVIEW
Initial Clinical Review    Admission: Date/Time/Statement: inpatient 2/24/19 @ [facility] This Encounter   Procedures    Inpatient Admission (expected length of stay for this patient Order details is greater than two midnights)     Standing Status:   Standing     Number of Occurrences:   1     Order Specific Question:   Admitting Physician     Answer:   Zion Suh     Order Specific Question:   Level of Care     Answer:   Med Surg [16]     Order Specific Question:   Estimated length of stay     Answer:   More than 2 Midnights     Order Specific Question:   Certification     Answer:   I certify that inpatient services are medically necessary for this patient for a duration of greater than two midnights  See H&P and MD Progress Notes for additional information about the patient's course of treatment  ED: Date/Time/Mode of Arrival:   ED Arrival Information     Expected Arrival Acuity Means of Arrival Escorted By Service Admission Type    - 2/24/2019 15:27 Emergent Walk-In Family Member General Medicine Emergency    Arrival Complaint    asthma, trouble breathing, no cardiac hx        Chief Complaint:   Chief Complaint   Patient presents with    Asthma     Pt  c/o shortness of breath with asthma exascerbation for two days  Pt  takes singulair and advair with no relief  Pt  has been around family with fever and illness  History of Illness: 47 y o  Male presents from home with shortness of breath for 2 days  PMHx of asthma  Uses inhalers and Advair at home without relief  Subjective fever today/cough with no sputum  - daughter at home with respiratory  illness earlier in week   Well controlled asthmatic follows with Allergist      ED Vital Signs:   ED Triage Vitals   Temperature Pulse Respirations Blood Pressure SpO2   02/24/19 1550 02/24/19 1550 02/24/19 1550 02/24/19 1600 02/24/19 1550   (!) 101 9 °F (38 8 °C) (!) 111 22 136/83 93 %      Temp Source Heart Rate Source Patient Position - Orthostatic VS BP Location FiO2 (%)   02/24/19 1550 02/24/19 1630 02/24/19 1600 02/24/19 1600 --   Oral Monitor Lying Right arm       Pain Score       02/24/19 1637       No Pain        Wt Readings from Last 1 Encounters:   02/24/19 73 3 kg (161 lb 9 6 oz)     Vital Signs (abnormal): 2/24/2019 temp 101 9 , 112, 113,     2/25/2019     Pertinent Labs/Diagnostic Test Results: 2/24/2019  , rbc 5 92, hct 49 5,   CXR: Linear atelectasis at the left lung base  CT CHEST: There is diffuse peribronchial thickening observed in both lungs, most pronounced in the right lower lobe with resulting luminal narrowing  Etiology could be infectious (bronchitis) or inflammatory/allergic including asthma  Scattered groundglass nodular   opacities in the basilar right lower lobe may be atelectatic or inflammatory/infectious     2/25/2019 glucose 167,     ED Treatment:   Medication Administration from 02/24/2019 1527 to 02/24/2019 2041       Date/Time Order Dose Route Action     02/24/2019 1617 methylPREDNISolone sodium succinate (Solu-MEDROL) injection 80 mg 80 mg Intravenous Given     02/24/2019 1609 ipratropium-albuterol (DUO-NEB) 0 5-2 5 mg/3 mL inhalation solution 3 mL 3 mL Nebulization Given     02/24/2019 1637 acetaminophen (TYLENOL) tablet 975 mg 975 mg Oral Given     02/24/2019 1637 ibuprofen (MOTRIN) tablet 600 mg 600 mg Oral Given     02/24/2019 1819 azithromycin (ZITHROMAX) 500 mg in sodium chloride 0 9% 250mL IVPB 500 mg 500 mg Intravenous New Bag     02/24/2019 1819 ceftriaxone (ROCEPHIN) 1 g/50 mL in dextrose IVPB 0 mg Intravenous Stopped     02/24/2019 1722 ceftriaxone (ROCEPHIN) 1 g/50 mL in dextrose IVPB 1,000 mg Intravenous New Bag     02/24/2019 1828 sodium chloride 0 9 % bolus 2,200 mL 0 mL Intravenous Stopped     02/24/2019 1728 sodium chloride 0 9 % bolus 2,200 mL 2,200 mL Intravenous New Bag     02/24/2019 1717 ipratropium-albuterol (DUO-NEB) 0 5-2 5 mg/3 mL inhalation solution 3 mL 3 mL Nebulization Given     02/24/2019 1839 albuterol inhalation solution 10 mg 10 mg Nebulization Given     02/24/2019 1839 ipratropium (ATROVENT) 0 02 % inhalation solution 1 mg 1 mg Nebulization Given     02/24/2019 1839 sodium chloride 0 9 % inhalation solution 3 mL 3 mL Nebulization Given        Past Medical/Surgical History: Active Ambulatory Problems     Diagnosis Date Noted    No Active Ambulatory Problems       Past Medical History:   Diagnosis Date    Asthma      Admitting Diagnosis: Shortness of breath [R06 02]  Bronchitis [J40]  Trouble breathing [R06 89]  Fever [R50 9]  Influenza-like illness [R69]  Age/Sex: 47 y o  male     Assessment/Plan: 47 y o  Male with well controlled asthma presents with shortness of breath for 2 days, no relief with home remedies  Inpatient workup suggestive of moderate asthma with acute exacerbation  Continue with IV Solu Medrol Q 8hr, peak flow rates with supplemental oxygen  CT demonstrating bronchial thickening/ground glass appearance-acute bronchitis due to infection  Continue IV Rocephin and Azithromycin, obtain sputum, blood cultures with influenza & RSV PCR  Sepsis due to undetermined organism: monitor vital signs, labs       Admission Orders:  Peripheral IV  Peak flow pre-post neb treatment  Nasal cannula  Spot pulse oximetry  Vitals Q 4 hr  Up w assist  Regular diet    Scheduled Meds:   Current Facility-Administered Medications:  azithromycin 500 mg Intravenous Q24H Earnest Ojeda MD   calcium carbonate 1,000 mg Oral Daily PRN Earnest Ojeda MD   cefTRIAXone 1,000 mg Intravenous Q24H Earnest Ojeda MD   dextromethorphan-guaiFENesin 10 mL Oral Q4H PRN Earnest Ojeda MD   docusate sodium 100 mg Oral BID PRN Earnest Ojeda MD   enoxaparin 40 mg Subcutaneous Daily Earnest Ojeda MD   fluticasone-vilanterol 1 puff Inhalation Daily Earnest Ojeda MD   guaiFENesin 600 mg Oral Q12H [facility] Earnest Ojeda MD   ipratropium 0 5 mg Nebulization Q6H Earnest Ojeda MD   levalbuterol 1 25 mg Nebulization Q6H Earnest Ojeda MD   methylPREDNISolone sodium succinate 40 mg Intravenous Q8H [facility] Earnest Ojeda MD   montelukast 10 mg Oral HS Earnest Ojeda MD   ondansetron 4 mg Intravenous Q6H PRN Earnest Ojeda MD       Network Utilization Review Department  Phone: [phone]; Fax [phone]  [email or web address]  ATTENTION: Please call with any questions or concerns to [phone]  and carefully listen to the prompts so that you are directed to the right person  Send all requests for admission clinical reviews, approved or denied determinations and any other requests to fax [phone]   All voicemails are confidential

## 2019-02-25 NOTE — ASSESSMENT & PLAN NOTE
· Chronic moderate asthma with acute exacerbation     · Taper steroids to PO prednisone   · Continue outpatient inhalers   · Follow up with pulm/allergist

## 2019-02-25 NOTE — PLAN OF CARE
Problem: RESPIRATORY - ADULT  Goal: Achieves optimal ventilation and oxygenation  Description  INTERVENTIONS:  - Assess for changes in respiratory status  - Assess for changes in mentation and behavior  - Position to facilitate oxygenation and minimize respiratory effort  - Oxygen administration by appropriate delivery method based on oxygen saturation (per order) or ABGs  - Initiate smoking cessation education as indicated  - Encourage broncho-pulmonary hygiene including cough, deep breathe, Incentive Spirometry  - Assess the need for suctioning and aspirate as needed  - Assess and instruct to report SOB or any respiratory difficulty  - Respiratory Therapy support as indicated  Outcome: Progressing     Problem: SAFETY ADULT  Goal: Patient will remain free of falls  Description  INTERVENTIONS:  - Assess patient frequently for physical needs  -  Identify cognitive and physical deficits and behaviors that affect risk of falls    -  [place] fall precautions as indicated by assessment   - Educate patient/family on patient safety including physical limitations  - Instruct patient to call for assistance with activity based on assessment  - Modify environment to reduce risk of injury  - Consider OT/PT consult to assist with strengthening/mobility  Outcome: Progressing     Problem: DISCHARGE PLANNING  Goal: Discharge to home or other facility with appropriate resources  Description  INTERVENTIONS:  - Identify barriers to discharge w/patient and caregiver  - Arrange for needed discharge resources and transportation as appropriate  - Identify discharge learning needs (meds, wound care, etc )  - Arrange for interpretive services to assist at discharge as needed  - Refer to Case Management Department for coordinating discharge planning if the patient needs post-hospital services based on physician/advanced practitioner order or complex needs related to functional status, cognitive ability, or social support system  Outcome: Progressing

## 2019-02-25 NOTE — DISCHARGE INSTRUCTIONS
Take prednisone 40mg x3 days, 30mg x 3 days, 20mg x 3days, 10mg x 3 days --> start on 2/26  Take tamiflu 75mg twice a day for 5 days, --> you started today and will need 1 more dose before bedtime  Acute Bronchitis   WHAT YOU SHOULD KNOW:   Acute bronchitis is swelling and irritation in the air passages of your lungs  This irritation may cause you to cough or have other breathing problems  Acute bronchitis often starts because of another viral illness, such as a cold or the flu  The illness spreads from your nose and throat to your windpipe and airways  Bronchitis is often called a chest cold  Acute bronchitis lasts about 2 weeks and is usually not a serious illness  AFTER YOU LEAVE:   Medicines:   · Ibuprofen or acetaminophen:  These medicines help lower a fever  They are available without a doctor's order  Ask your healthcare provider which medicine is right for you  Ask how much to take and how often to take it  Follow directions  These medicines can cause stomach bleeding if not taken correctly  Ibuprofen can cause kidney damage  Do not take ibuprofen if you have kidney disease, an ulcer, or allergies to aspirin  Acetaminophen can cause liver damage  Do not drink alcohol if you take acetaminophen  · Cough medicine: This medicine helps loosen mucus in your lungs and make it easier to cough up  This can help you breathe easier  · Inhalers: You may need one or more inhalers to help you breathe easier and cough less  An inhaler gives your medicine in a mist form so that you can breathe it into your lungs  Ask your healthcare provider to show you how to use your inhaler correctly  · Steroid medicine:  Steroid medicine helps open your air passages so you can breathe easier  · Take your medicine as directed  Call your healthcare provider if you think your medicine is not helping or if you have side effects  Tell him if you are allergic to any medicine   Keep a list of the medicines, vitamins, and herbs you take  Include the amounts, and when and why you take them  Bring the list or the pill bottles to follow-up visits  Carry your medicine list with you in case of an emergency  How to use an inhaler:   · Shake the inhaler well to make sure you get the correct amount of medicine per puff  Remove the cover from your inhaler's mouthpiece  If you are using a spacer, connect your inhaler to the flat end of the spacer  · Exhale as much air from your lungs as you can  Put the mouthpiece in your mouth past your front teeth and rest it on the top of your tongue  Do not block the mouthpiece opening with your tongue  · Breathe in through your mouth at a slow and steady rate  As you do this, press the inhaler to release the puff of medicine  Finish breathing in slowly and deeply as you inhale the medicine  When your lungs are full, hold your breath for 10 seconds  Then breathe out slowly through puckered lips or through your nose  · If you need to take more puffs, wait at least 1 minute between each puff  · Rinse your mouth with water after you use the inhaler  This may keep you from getting a mouth infection or irritation  · Follow the instructions that come with your inhaler to clean it  You should clean your inhaler at least once a week  Ways to care for yourself:   · Avoid alcohol:  Alcohol dulls your urge to cough and sneeze  When you have bronchitis, you need to be able to cough and sneeze to clear your air passages  Alcohol also causes your body to lose fluid  This can make the mucus in your lungs thicker and harder to cough up  · Avoid irritants in the air:  Do not smoke or allow others to smoke around you  Avoid chemicals, fumes, and dust  Wear a face mask if you must work around dust or fumes  Stay inside on days when air pollution levels are high  If you have allergies, stay inside when pollen counts are high  Avoid aerosol products   This includes spray-on deodorant, bug spray, and hair spray  · Drink more liquids:  Most people should drink at least 8 eight-ounce cups of water a day  You may need to drink more liquids when you have acute bronchitis  Liquids help keep your air passages moist and help you cough up mucus  · Get more rest:  You may feel like resting more  Slowly start to do more each day  Rest when you feel it is needed  · Eat healthy foods:  Eat a variety healthy foods every day  Your diet should include fruits, vegetables, breads, and protein (such as chicken, fish, and beans)  Dairy products (such as milk, cheese, and ice cream) can sometimes increase the amount of mucus your body makes  Ask if you should decrease your intake of dairy products  · Use a humidifier:  Use a cool mist humidifier to increase air moisture in your home  This may make it easier for you to breathe and help decrease your cough  Decrease your risk of acute bronchitis:   · Get the vaccinations you need:  Ask your healthcare provider if you should get vaccinated against the flu or pneumonia  · Avoid things that may irritate your lungs:  Stay inside or cover your mouth and nose with a scarf when you are outside during cold weather  You should also stay inside on days when air pollution levels are high  If you have allergies, stay inside when pollen counts are high  Avoid using aerosol products in your home  This includes spray-on deodorant, bug spray, and hair spray  · Avoid the spread of germs:        [facility] your hands often with soap and water  Carry germ-killing gel with you  You can use the gel to clean your hands when there is no soap and water available  ¨ Do not touch your eyes, nose, or mouth unless you have washed your hands first     ¨ Always cover your mouth when you cough  Cough into a tissue or your shirtsleeve so you do not spread germs from your hands  ¨ Try to avoid people who have a cold or the flu  If you are sick, stay away from others as much as possible    Follow up with your healthcare provider as directed:  Write down questions you have so you will remember to ask them during your follow-up visits  Contact your healthcare provider if:   · You have a fever  · Your skin becomes itchy or you have a rash after you take your medicine  · Your breathing problems do not go away or get worse  · Your cough does not get better with treatment  · You cough up blood  · You have questions or concerns about your condition or care  Seek care immediately or call 911 if:   · You faint  · Your lips or fingernails turn blue  · You feel like you are not getting enough air when you breathe  · You have swelling of your lips, tongue, or throat that makes it hard to breathe or swallow  © 2014 [employer] is for End User's use only and may not be sold, redistributed or otherwise used for commercial purposes  All illustrations and images included in CareNotes® are the copyrighted property of A [employer]  or Kaushik Justice  The above information is an  only  It is not intended as medical advice for individual conditions or treatments  Talk to your doctor, nurse or pharmacist before following any medical regimen to see if it is safe and effective for you

## 2019-02-25 NOTE — ASSESSMENT & PLAN NOTE
· POA, e/b fever, tachycardia and bronchitis  · Continue to monitor parameters  · Lactic acid was negative

## 2019-02-25 NOTE — ASSESSMENT & PLAN NOTE
· Preibronchial thickening and ground glass opacities noted on CT chest consistent with bronchitis  · Procalcitonin normal, urinary antigens negative - d/c antibiotics   · blood cultures pending on d/c - will follow up   · Positive influenza A - start tamiflu as sx just started less than 48 hrs ago

## 2019-02-25 NOTE — MEDICAL STUDENT
Consultation - Nunu Reynoso 47 y o  male MRN: [id number]    Unit/Bed#: -01 Encounter: [id number]      Assessment/Plan   1  Moderate asthma with acute exacerbation  - Pt still endorsing cough and SOB  - On 1L O2 satting 93%  - Continue IV salumedrol 40mg q8   - Continue on O2, and nebs per respiratory  - Monitor peak flow, pt with peak flow of 270 L/min during visit    2  Acute URI  - CBC unremarkable  - Lactic acid normal  - Influenza by PCR pending  - Continue IV antibiotics  - Procalcitonin normal last night, morning draw pending  - Sputum culture and gram stain peniding  - Strep Pneumo/legionella urine antigen pending    3  Sepsis due to undetermined organism  - Morning vitals with no fever, tachypnea or tachycardia  - Pt denies fevers/chills/diaphoresis  - Continue IV abx and monitor lactics    4  Pulmonary nodules  - Noted on CT of chest, to be monitored outpatient    History of Present Illness     HPI: Nunu Reynoso is a 47y o  year old male with a PMH of asthma controlled on advair, singulair and PRN ventolin who presents with cough and SOB for roughly 48 hours  On admission he was found to be febrile, as well as mildly tachypnic and tachycardic  Pt denies feeling feverish/chills on admission, but did state he was experiencing bad headaches along with his SOB and cough  Pt was started on IV steroids and antibiotics  Today Mr Devante Amezquita is still endorsing cough and shortness of breath  States the cough is mostly dry, but sometimes mildly productive for "white frothy" sputum  States his chest is very "tight" and there is difficulty taking deep breaths  Pt is on 1L of O2 currently  Satting 92-93%    Pt's daughter had recent illness at home  States daughter had flu-like sx  Denies fevers, chills, sweats, nausea, vomiting, hemoptysis, dizziness/lightheadedness, chest pain, abdominal pain  Pt denies hx of intubations for asthma      Consults    Review of Systems   Constitutional: Negative for activity change, appetite change, chills, diaphoresis, fatigue and fever  HENT: Negative for congestion, drooling, rhinorrhea, sinus pressure, sinus pain and sore throat  Eyes: Negative  Respiratory: Positive for cough, chest tightness, shortness of breath and wheezing  Cardiovascular: Negative  Gastrointestinal: Negative  Genitourinary: Negative  Musculoskeletal: Negative  Skin: Negative  Neurological: Positive for headaches  Negative for dizziness, facial asymmetry, weakness, light-headedness and numbness  Psychiatric/Behavioral: Negative          Historical Information   Past Medical History:   Diagnosis Date    Asthma      Past Surgical History:   Procedure Laterality Date    NOSE SURGERY       Social History   Social History     Substance and Sexual Activity   Alcohol Use Not on file     Social History     Substance and Sexual Activity   Drug Use Not on file     Social History     Tobacco Use   Smoking Status Not on file     Family History: non-contributory    Meds/Allergies   current meds:   Current Facility-Administered Medications   Medication Dose Route Frequency    azithromycin (ZITHROMAX) 500 mg in sodium chloride 0 9% 250mL IVPB 500 mg  500 mg Intravenous Q24H    calcium carbonate (TUMS) chewable tablet 1,000 mg  1,000 mg Oral Daily PRN    ceftriaxone (ROCEPHIN) 1 g/50 mL in dextrose IVPB  1,000 mg Intravenous Q24H    dextromethorphan-guaiFENesin (ROBITUSSIN DM)  mg/5 mL oral syrup 10 mL  10 mL Oral Q4H PRN    docusate sodium (COLACE) capsule 100 mg  100 mg Oral BID PRN    enoxaparin (LOVENOX) subcutaneous injection 40 mg  40 mg Subcutaneous Daily    fluticasone-vilanterol (BREO ELLIPTA) 200-25 MCG/INH inhaler 1 puff  1 puff Inhalation Daily    guaiFENesin (MUCINEX) 12 hr tablet 600 mg  600 mg Oral Q12H ROMIE    ipratropium (ATROVENT) 0 02 % inhalation solution 0 5 mg  0 5 mg Nebulization Q6H    levalbuterol (XOPENEX) inhalation solution 1 25 mg  1 25 mg Nebulization Q6H    methylPREDNISolone sodium succinate (Solu-MEDROL) injection 40 mg  40 mg Intravenous Q8H [address]    montelukast (SINGULAIR) tablet 10 mg  10 mg Oral HS    ondansetron (ZOFRAN) injection 4 mg  4 mg Intravenous Q6H PRN     No Known Allergies    Objective   Vitals: Blood pressure 125/73, pulse 95, temperature 98 9 °F (37 2 °C), temperature source Oral, resp  rate 19, height 5' 6" (1 676 m), weight 73 3 kg (161 lb 9 6 oz), SpO2 95 %  Intake/Output Summary (Last 24 hours) at 2/25/2019 2229  Last data filed at 2/24/2019 5717  Gross per 24 hour   Intake 2250 ml   Output    Net 2250 ml     Invasive Devices     Peripheral Intravenous Line            Peripheral IV 02/24/19 Left Forearm less than 1 day    Peripheral IV 02/24/19 Right Forearm less than 1 day                Physical Exam   Constitutional: He is oriented to person, place, and time  He appears well-developed and well-nourished  No distress  HENT:   Head: Normocephalic and atraumatic  Right Ear: External ear normal    Left Ear: External ear normal    Eyes: Pupils are equal, round, and reactive to light  Conjunctivae and EOM are normal    Neck: Normal range of motion  Neck supple  Cardiovascular: Normal rate, regular rhythm, normal heart sounds and intact distal pulses  No murmur heard  Pulmonary/Chest: Effort normal  He has wheezes in the right upper field, the right middle field, the right lower field, the left upper field, the left middle field and the left lower field  He has no rales  Diffuse inspiratory/expiratory wheezing  Worse in R lung fields  Abdominal: Soft  Bowel sounds are normal  He exhibits no distension  There is no tenderness  There is no guarding  Musculoskeletal: Normal range of motion  He exhibits no edema, tenderness or deformity  Neurological: He is alert and oriented to person, place, and time  No cranial nerve deficit or sensory deficit  Skin: Skin is warm and dry   Capillary refill takes less than 2 seconds  No rash noted  He is not diaphoretic  No erythema  Psychiatric: He has a normal mood and affect           VTE Prophylaxis: Enoxaparin (Lovenox)    Code Status: Level 1 - Full Code

## 2019-02-27 LAB
BACTERIA SPT RESP CULT: NORMAL
GRAM STN SPEC: NORMAL

## 2019-03-02 LAB
BACTERIA BLD CULT: NORMAL
BACTERIA BLD CULT: NORMAL

## 2019-11-27 ENCOUNTER — TRANSCRIBE ORDERS (OUTPATIENT)
Dept: RADIOLOGY | Facility: HOSPITAL | Age: 54
End: 2019-11-27

## 2019-11-27 ENCOUNTER — HOSPITAL ENCOUNTER (OUTPATIENT)
Dept: RADIOLOGY | Facility: HOSPITAL | Age: 54
Discharge: HOME | End: 2019-11-27
Payer: COMMERCIAL

## 2019-11-27 DIAGNOSIS — J32.9 CHRONIC SINUSITIS, UNSPECIFIED LOCATION: ICD-10-CM

## 2019-11-27 PROCEDURE — 70486 CT MAXILLOFACIAL W/O DYE: CPT

## 2019-12-02 PROBLEM — J33.9 NASAL POLYPOSIS: Status: ACTIVE | Noted: 2019-12-02

## 2019-12-02 PROBLEM — H93.8X2 BLOCKED EAR, LEFT: Status: ACTIVE | Noted: 2019-12-02

## 2019-12-02 PROBLEM — H65.22 LEFT CHRONIC SEROUS OTITIS MEDIA: Status: ACTIVE | Noted: 2019-12-02

## 2019-12-19 PROBLEM — L30.9 CHRONIC ECZEMA: Status: ACTIVE | Noted: 2019-12-19

## 2019-12-19 PROBLEM — J34.89 SINUS PRESSURE: Status: ACTIVE | Noted: 2019-12-19

## 2020-01-02 PROBLEM — H93.12 LEFT-SIDED TINNITUS: Status: ACTIVE | Noted: 2020-01-02

## 2020-01-03 PROBLEM — J45.40 MODERATE PERSISTENT ASTHMA WITHOUT COMPLICATION: Status: ACTIVE | Noted: 2020-01-03

## 2020-03-17 PROBLEM — H69.83 ETD (EUSTACHIAN TUBE DYSFUNCTION), BILATERAL: Status: ACTIVE | Noted: 2020-03-17

## 2021-02-11 ENCOUNTER — LAB (OUTPATIENT)
Dept: LAB | Age: 56
End: 2021-02-11
Payer: COMMERCIAL

## 2021-02-11 ENCOUNTER — OFFICE VISIT (OUTPATIENT)
Dept: LAB | Age: 56
End: 2021-02-11
Payer: COMMERCIAL

## 2021-02-11 DIAGNOSIS — H69.93 DISORDER OF BOTH EUSTACHIAN TUBES: ICD-10-CM

## 2021-02-11 LAB
ANION GAP SERPL CALCULATED.3IONS-SCNC: 2 MMOL/L (ref 4–13)
BASOPHILS # BLD AUTO: 0.12 THOUSANDS/ΜL (ref 0–0.1)
BASOPHILS NFR BLD AUTO: 1 % (ref 0–1)
BUN SERPL-MCNC: 13 MG/DL (ref 5–25)
CALCIUM SERPL-MCNC: 9 MG/DL (ref 8.3–10.1)
CHLORIDE SERPL-SCNC: 108 MMOL/L (ref 100–108)
CO2 SERPL-SCNC: 30 MMOL/L (ref 21–32)
CREAT SERPL-MCNC: 0.94 MG/DL (ref 0.6–1.3)
EOSINOPHIL # BLD AUTO: 1.87 THOUSAND/ΜL (ref 0–0.61)
EOSINOPHIL NFR BLD AUTO: 19 % (ref 0–6)
ERYTHROCYTE [DISTWIDTH] IN BLOOD BY AUTOMATED COUNT: 13.2 % (ref 11.6–15.1)
GFR SERPL CREATININE-BSD FRML MDRD: 92 ML/MIN/1.73SQ M
GLUCOSE SERPL-MCNC: 89 MG/DL (ref 65–140)
HCT VFR BLD AUTO: 50.9 % (ref 36.5–49.3)
HGB BLD-MCNC: 16.2 G/DL (ref 12–17)
IMM GRANULOCYTES # BLD AUTO: 0.02 THOUSAND/UL (ref 0–0.2)
IMM GRANULOCYTES NFR BLD AUTO: 0 % (ref 0–2)
LYMPHOCYTES # BLD AUTO: 1.87 THOUSANDS/ΜL (ref 0.6–4.47)
LYMPHOCYTES NFR BLD AUTO: 19 % (ref 14–44)
MCH RBC QN AUTO: 28.2 PG (ref 26.8–34.3)
MCHC RBC AUTO-ENTMCNC: 31.8 G/DL (ref 31.4–37.4)
MCV RBC AUTO: 89 FL (ref 82–98)
MONOCYTES # BLD AUTO: 1.05 THOUSAND/ΜL (ref 0.17–1.22)
MONOCYTES NFR BLD AUTO: 11 % (ref 4–12)
NEUTROPHILS # BLD AUTO: 4.98 THOUSANDS/ΜL (ref 1.85–7.62)
NEUTS SEG NFR BLD AUTO: 50 % (ref 43–75)
NRBC BLD AUTO-RTO: 0 /100 WBCS
PLATELET # BLD AUTO: 209 THOUSANDS/UL (ref 149–390)
PMV BLD AUTO: 11.6 FL (ref 8.9–12.7)
POTASSIUM SERPL-SCNC: 4.2 MMOL/L (ref 3.5–5.3)
RBC # BLD AUTO: 5.75 MILLION/UL (ref 3.88–5.62)
SODIUM SERPL-SCNC: 140 MMOL/L (ref 136–145)
WBC # BLD AUTO: 9.91 THOUSAND/UL (ref 4.31–10.16)

## 2021-02-11 PROCEDURE — 93005 ELECTROCARDIOGRAM TRACING: CPT

## 2021-02-11 PROCEDURE — 80048 BASIC METABOLIC PNL TOTAL CA: CPT

## 2021-02-11 PROCEDURE — 85025 COMPLETE CBC W/AUTO DIFF WBC: CPT

## 2021-02-11 PROCEDURE — 36415 COLL VENOUS BLD VENIPUNCTURE: CPT

## 2021-02-12 LAB
ATRIAL RATE: 67 BPM
P AXIS: 48 DEGREES
PR INTERVAL: 142 MS
QRS AXIS: -1 DEGREES
QRSD INTERVAL: 98 MS
QT INTERVAL: 398 MS
QTC INTERVAL: 420 MS
T WAVE AXIS: 4 DEGREES
VENTRICULAR RATE: 67 BPM

## 2021-02-12 PROCEDURE — 93010 ELECTROCARDIOGRAM REPORT: CPT | Performed by: INTERNAL MEDICINE
